# Patient Record
Sex: MALE | Race: OTHER | HISPANIC OR LATINO | URBAN - METROPOLITAN AREA
[De-identification: names, ages, dates, MRNs, and addresses within clinical notes are randomized per-mention and may not be internally consistent; named-entity substitution may affect disease eponyms.]

---

## 2021-08-02 ENCOUNTER — EMERGENCY (EMERGENCY)
Facility: HOSPITAL | Age: 21
LOS: 0 days | Discharge: HOME | End: 2021-08-02
Attending: EMERGENCY MEDICINE | Admitting: EMERGENCY MEDICINE
Payer: MEDICAID

## 2021-08-02 VITALS
HEIGHT: 69 IN | HEART RATE: 107 BPM | OXYGEN SATURATION: 100 % | TEMPERATURE: 100 F | SYSTOLIC BLOOD PRESSURE: 128 MMHG | DIASTOLIC BLOOD PRESSURE: 72 MMHG | WEIGHT: 149.91 LBS | RESPIRATION RATE: 18 BRPM

## 2021-08-02 DIAGNOSIS — M25.512 PAIN IN LEFT SHOULDER: ICD-10-CM

## 2021-08-02 DIAGNOSIS — M21.822 OTHER SPECIFIED ACQUIRED DEFORMITIES OF LEFT UPPER ARM: ICD-10-CM

## 2021-08-02 PROCEDURE — 73030 X-RAY EXAM OF SHOULDER: CPT | Mod: 26,LT

## 2021-08-02 PROCEDURE — 99284 EMERGENCY DEPT VISIT MOD MDM: CPT

## 2021-08-02 NOTE — ED PROVIDER NOTE - OBJECTIVE STATEMENT
this is 19 yo male presents to ed for evaluation of left shoulder pain. patient denies any injury. patient states he slept on shoulder incorrectly,. patient does admit that he has old injury and this is doing on intermittently for months

## 2021-08-02 NOTE — ED PROVIDER NOTE - CARE PROVIDER_API CALL
Tramaine Ram)  Orthopaedic Surgery  [address] [place] [address]  Phone: [phone]  Fax: [phone]  Follow Up Time:

## 2021-08-02 NOTE — ED ADULT NURSE NOTE - NS_SISCREENINGSR_GEN_ALL_ED
no loss of consciousness, no gait abnormality, no headache, no sensory deficits, and no weakness.
Negative

## 2021-08-02 NOTE — ED PROVIDER NOTE - PHYSICAL EXAMINATION
--EXAM--  VITAL SIGNS: I have reviewed vs documented at present.  CONSTITUTIONAL: Well-developed; well-nourished; in no acute distress.     CARD: S1, S2, Regular rate and rhythm.   RESP: No wheezes, rales or rhonchi.    EXT:left shoulder full rom no tenderness there is AC Separation   NEURO: Alert, oriented, grossly unremarkable. Strength 5/5 in all extremities. Sensation intact throughout.

## 2021-08-02 NOTE — ED PROVIDER NOTE - ATTENDING CONTRIBUTION TO CARE
20 M to ED with L shoulder pain after sleeping on his side.  h/o ? AC injury with ? separation. States this has been going on for several months but worse to day so to ED for eval.  AVSS, exam as noted, CTAB, RRR, FROm of shoulder with pain, + deformity to AC joint

## 2021-08-02 NOTE — ED PROVIDER NOTE - PATIENT PORTAL LINK FT
REPORT GIVEN TO MEGAN BECK FROM [facility]. CHILDRENS TO RESUME CARE OF
INFANT AT THIS TIME. PARENTS AT BEDSIDE. INFORMATION GIVEN TO PARENTS ABOUT
TRANSFER PROCESS AT THIS TIME. MOTHER WILL REMAIN AS PATIENT HERE. FATHER WILL
GO TO  TO BE WITH INFANT You can access the FollowMyHealth Patient Portal offered by [facility] by registering at the following website: [email or web address]. By joining [employer]’s FollowMyHealth portal, you will also be able to view your health information using other applications (apps) compatible with our system.

## 2023-01-15 ENCOUNTER — NON-APPOINTMENT (OUTPATIENT)
Age: 23
End: 2023-01-15

## 2023-05-23 NOTE — ED PROVIDER NOTE - NS ED ROS FT
Detail Level: Detailed Review of Systems:  	•	CONSTITUTIONAL - no fever, no diaphoresis, no chills  	  	•	RESPIRATORY - no shortness of breath, no cough  	•	CARDIAC - no chest pain, no palpitations    	•	MUSCULOSKELETAL - left shoulder pain   	•	NEUROLOGIC - no weakness, no headache, no paresthesias, no LOC

## 2023-07-27 ENCOUNTER — NON-APPOINTMENT (OUTPATIENT)
Age: 23
End: 2023-07-27

## 2023-09-10 ENCOUNTER — NON-APPOINTMENT (OUTPATIENT)
Age: 23
End: 2023-09-10

## 2023-09-27 NOTE — ED ADULT NURSE NOTE - NS ED NOTE ABUSE SUSPICION NEGLECT YN
Zuhair Oleary  Vascular/Intervent Radiology  [address]  [place] [address]  Phone: [phone]  Fax: [phone]  Follow Up Time: 1 week  
No

## 2023-12-25 ENCOUNTER — NON-APPOINTMENT (OUTPATIENT)
Age: 23
End: 2023-12-25

## 2024-02-05 ENCOUNTER — EMERGENCY (EMERGENCY)
Facility: HOSPITAL | Age: 24
LOS: 1 days | Discharge: HOME | End: 2024-02-05
Attending: EMERGENCY MEDICINE
Payer: SELF-PAY

## 2024-02-05 VITALS
RESPIRATION RATE: 16 BRPM | DIASTOLIC BLOOD PRESSURE: 57 MMHG | SYSTOLIC BLOOD PRESSURE: 102 MMHG | OXYGEN SATURATION: 98 % | HEART RATE: 62 BPM | TEMPERATURE: 98 F

## 2024-02-05 VITALS — SYSTOLIC BLOOD PRESSURE: 102 MMHG | DIASTOLIC BLOOD PRESSURE: 61 MMHG

## 2024-02-05 PROCEDURE — 99283 EMERGENCY DEPT VISIT LOW MDM: CPT

## 2024-02-05 PROCEDURE — 99284 EMERGENCY DEPT VISIT MOD MDM: CPT

## 2024-02-05 RX ORDER — ACETAMINOPHEN 500 MG
650 TABLET ORAL ONCE
Refills: 0 | Status: COMPLETED | OUTPATIENT
Start: 2024-02-05 | End: 2024-02-05

## 2024-02-05 RX ORDER — LIDOCAINE 4 G/100G
1 CREAM TOPICAL ONCE
Refills: 0 | Status: COMPLETED | OUTPATIENT
Start: 2024-02-05 | End: 2024-02-05

## 2024-02-05 RX ORDER — METHOCARBAMOL 500 MG/1
1500 TABLET, FILM COATED ORAL ONCE
Refills: 0 | Status: COMPLETED | OUTPATIENT
Start: 2024-02-05 | End: 2024-02-05

## 2024-02-05 RX ORDER — METHOCARBAMOL 500 MG/1
2 TABLET, FILM COATED ORAL
Qty: 18 | Refills: 0
Start: 2024-02-05 | End: 2024-02-07

## 2024-02-05 RX ADMIN — Medication 650 MILLIGRAM(S): at 21:17

## 2024-02-05 RX ADMIN — METHOCARBAMOL 1500 MILLIGRAM(S): 500 TABLET, FILM COATED ORAL at 21:16

## 2024-02-05 RX ADMIN — LIDOCAINE 1 PATCH: 4 CREAM TOPICAL at 21:17

## 2024-02-05 NOTE — ED PROVIDER NOTE - PATIENT PORTAL LINK FT
You can access the FollowMyHealth Patient Portal offered by [facility] by registering at the following website: [email or web address]. By joining [employer]’s FollowMyHealth portal, you will also be able to view your health information using other applications (apps) compatible with our system.

## 2024-02-05 NOTE — ED PROVIDER NOTE - NSFOLLOWUPINSTRUCTIONS_ED_ALL_ED_FT
Fill your prescription and take as directed. Do not drive, drink alcohol or operate heavy/dangerous machinery while taking the medication. Call to make an appointment to follow up with your primary care provider. Return to ER if symptoms worsen.
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
## 2024-02-06 PROBLEM — Z78.9 OTHER SPECIFIED HEALTH STATUS: Chronic | Status: ACTIVE | Noted: 2021-08-02

## 2024-08-19 NOTE — ED PROVIDER NOTE - MUSCULOSKELETAL MINIMAL EXAM
Negative midline tenderness from cervical to lumbar spine. +Lumbar paraspinal TTP. Moving all extremities freely. Distal circulation and sensation intact. normal sinus rhythm

## 2025-05-20 ENCOUNTER — EMERGENCY (EMERGENCY)
Facility: HOSPITAL | Age: 25
LOS: 0 days | Discharge: HOME | End: 2025-05-21
Attending: STUDENT IN AN ORGANIZED HEALTH CARE EDUCATION/TRAINING PROGRAM
Payer: COMMERCIAL

## 2025-05-20 VITALS
HEIGHT: 69 IN | WEIGHT: 156.09 LBS | DIASTOLIC BLOOD PRESSURE: 62 MMHG | SYSTOLIC BLOOD PRESSURE: 103 MMHG | RESPIRATION RATE: 18 BRPM | TEMPERATURE: 98 F | HEART RATE: 56 BPM | OXYGEN SATURATION: 97 %
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
## 2025-05-20 DIAGNOSIS — Z87.39 PERSONAL HISTORY OF OTHER DISEASES OF THE MUSCULOSKELETAL SYSTEM AND CONNECTIVE TISSUE: ICD-10-CM

## 2025-05-20 DIAGNOSIS — S39.012A STRAIN OF MUSCLE, FASCIA AND TENDON OF LOWER BACK, INITIAL ENCOUNTER: ICD-10-CM

## 2025-05-20 DIAGNOSIS — M54.50 LOW BACK PAIN, UNSPECIFIED: ICD-10-CM
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
## 2025-05-20 PROCEDURE — 99284 EMERGENCY DEPT VISIT MOD MDM: CPT
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
## 2025-05-20 NOTE — ED ADULT TRIAGE NOTE - BP NONINVASIVE SYSTOLIC (MM HG)
Detail Level: Simple Price (Do Not Change): 0.00 Instructions: This plan will send the code FBSE to the PM system.  DO NOT or CHANGE the price. 103

## 2025-05-20 NOTE — ED PROVIDER NOTE - NS ED MD DISPO DISCHARGE
From: Christen Herr  To: Eileen MARTHA Lucy  Sent: 6/28/2023 11:03 PM CDT  Subject: blood pressure    Hi Eileen,    This blood pressure is much improved but not at goal. The goal is < 130/90. You could try to increase the amlodipine to 2 tablets daily or I could change to a different medication (combination of this same dose of amlodipine with benazepril). What do you prefer?    Dr. Herr    Home
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
## 2025-05-20 NOTE — ED PROVIDER NOTE - PATIENT PORTAL LINK FT
You can access the FollowMyHealth Patient Portal offered by [facility] by registering at the following website: [email or web address]. By joining [employer]’s FollowMyHealth portal, you will also be able to view your health information using other applications (apps) compatible with our system.

## 2025-05-21 VITALS
HEART RATE: 77 BPM | TEMPERATURE: 98 F | RESPIRATION RATE: 20 BRPM | DIASTOLIC BLOOD PRESSURE: 73 MMHG | OXYGEN SATURATION: 98 % | SYSTOLIC BLOOD PRESSURE: 113 MMHG

## 2025-05-21 PROCEDURE — 72100 X-RAY EXAM L-S SPINE 2/3 VWS: CPT | Mod: 26
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
